# Patient Record
Sex: FEMALE | Race: ASIAN | NOT HISPANIC OR LATINO | Employment: FULL TIME | URBAN - METROPOLITAN AREA
[De-identification: names, ages, dates, MRNs, and addresses within clinical notes are randomized per-mention and may not be internally consistent; named-entity substitution may affect disease eponyms.]

---

## 2018-03-07 LAB
CHOLESTEROL (EXTERNAL): 160 MG/DL (ref 100–199)
HDLC SERPL-MCNC: 79 MG/DL
LDL CHOLESTEROL (EXTERNAL): 60 MG/DL
NON HDL CHOLESTEROL (EXTERNAL): 81 MG/DL
TRIGLYCERIDES (EXTERNAL): 104 MG/DL
TSH SERPL-ACNC: 1.58 UIU/ML (ref 0.35–4.94)

## 2018-06-19 LAB — HIV 1&2 EXT: NORMAL

## 2020-06-24 ENCOUNTER — TRANSFERRED RECORDS (OUTPATIENT)
Dept: MULTI SPECIALTY CLINIC | Facility: CLINIC | Age: 40
End: 2020-06-24

## 2020-06-24 LAB
HPV ABSTRACT: NORMAL
PAP-ABSTRACT: NORMAL

## 2022-04-18 ENCOUNTER — OFFICE VISIT (OUTPATIENT)
Dept: FAMILY MEDICINE | Facility: CLINIC | Age: 42
End: 2022-04-18
Payer: COMMERCIAL

## 2022-04-18 VITALS
DIASTOLIC BLOOD PRESSURE: 80 MMHG | WEIGHT: 140 LBS | HEIGHT: 61 IN | OXYGEN SATURATION: 99 % | RESPIRATION RATE: 25 BRPM | TEMPERATURE: 98.6 F | SYSTOLIC BLOOD PRESSURE: 126 MMHG | HEART RATE: 65 BPM | BODY MASS INDEX: 26.43 KG/M2

## 2022-04-18 DIAGNOSIS — Z11.59 NEED FOR HEPATITIS C SCREENING TEST: ICD-10-CM

## 2022-04-18 DIAGNOSIS — J34.2 DEVIATED NASAL SEPTUM: ICD-10-CM

## 2022-04-18 DIAGNOSIS — R05.9 COUGH: ICD-10-CM

## 2022-04-18 DIAGNOSIS — M25.659 STIFFNESS OF HIP JOINT, UNSPECIFIED LATERALITY: ICD-10-CM

## 2022-04-18 DIAGNOSIS — Z13.1 SCREENING FOR DIABETES MELLITUS: ICD-10-CM

## 2022-04-18 DIAGNOSIS — H65.03 NON-RECURRENT ACUTE SEROUS OTITIS MEDIA OF BOTH EARS: ICD-10-CM

## 2022-04-18 DIAGNOSIS — Z00.00 ROUTINE GENERAL MEDICAL EXAMINATION AT A HEALTH CARE FACILITY: Primary | ICD-10-CM

## 2022-04-18 DIAGNOSIS — R07.89 CHEST WALL PAIN: ICD-10-CM

## 2022-04-18 DIAGNOSIS — Z13.21 ENCOUNTER FOR VITAMIN DEFICIENCY SCREENING: ICD-10-CM

## 2022-04-18 DIAGNOSIS — R09.81 NASAL CONGESTION: ICD-10-CM

## 2022-04-18 DIAGNOSIS — Z13.0 SCREENING, ANEMIA, DEFICIENCY, IRON: ICD-10-CM

## 2022-04-18 DIAGNOSIS — Z13.220 LIPID SCREENING: ICD-10-CM

## 2022-04-18 LAB
ALBUMIN SERPL-MCNC: 4.1 G/DL (ref 3.4–5)
ALP SERPL-CCNC: 51 U/L (ref 40–150)
ALT SERPL W P-5'-P-CCNC: 32 U/L (ref 0–50)
ANION GAP SERPL CALCULATED.3IONS-SCNC: 4 MMOL/L (ref 3–14)
AST SERPL W P-5'-P-CCNC: 20 U/L (ref 0–45)
BILIRUB SERPL-MCNC: 0.4 MG/DL (ref 0.2–1.3)
BUN SERPL-MCNC: 13 MG/DL (ref 7–30)
CALCIUM SERPL-MCNC: 8.9 MG/DL (ref 8.5–10.1)
CHLORIDE BLD-SCNC: 108 MMOL/L (ref 94–109)
CHOLEST SERPL-MCNC: 162 MG/DL
CO2 SERPL-SCNC: 27 MMOL/L (ref 20–32)
CREAT SERPL-MCNC: 0.63 MG/DL (ref 0.52–1.04)
DEPRECATED CALCIDIOL+CALCIFEROL SERPL-MC: 48 UG/L (ref 20–75)
ERYTHROCYTE [DISTWIDTH] IN BLOOD BY AUTOMATED COUNT: 11.9 % (ref 10–15)
FASTING STATUS PATIENT QL REPORTED: YES
GFR SERPL CREATININE-BSD FRML MDRD: >90 ML/MIN/1.73M2
GLUCOSE BLD-MCNC: 101 MG/DL (ref 70–99)
HCT VFR BLD AUTO: 40.7 % (ref 35–47)
HCV AB SERPL QL IA: NONREACTIVE
HDLC SERPL-MCNC: 62 MG/DL
HGB BLD-MCNC: 13.4 G/DL (ref 11.7–15.7)
LDLC SERPL CALC-MCNC: 71 MG/DL
MCH RBC QN AUTO: 30.2 PG (ref 26.5–33)
MCHC RBC AUTO-ENTMCNC: 32.9 G/DL (ref 31.5–36.5)
MCV RBC AUTO: 92 FL (ref 78–100)
NONHDLC SERPL-MCNC: 100 MG/DL
PLATELET # BLD AUTO: 250 10E3/UL (ref 150–450)
POTASSIUM BLD-SCNC: 4.3 MMOL/L (ref 3.4–5.3)
PROT SERPL-MCNC: 7.5 G/DL (ref 6.8–8.8)
RBC # BLD AUTO: 4.43 10E6/UL (ref 3.8–5.2)
SODIUM SERPL-SCNC: 139 MMOL/L (ref 133–144)
TRIGL SERPL-MCNC: 147 MG/DL
WBC # BLD AUTO: 6.4 10E3/UL (ref 4–11)

## 2022-04-18 PROCEDURE — 82306 VITAMIN D 25 HYDROXY: CPT | Performed by: FAMILY MEDICINE

## 2022-04-18 PROCEDURE — 80061 LIPID PANEL: CPT | Performed by: FAMILY MEDICINE

## 2022-04-18 PROCEDURE — 36415 COLL VENOUS BLD VENIPUNCTURE: CPT | Performed by: FAMILY MEDICINE

## 2022-04-18 PROCEDURE — 99385 PREV VISIT NEW AGE 18-39: CPT | Performed by: FAMILY MEDICINE

## 2022-04-18 PROCEDURE — 85027 COMPLETE CBC AUTOMATED: CPT | Performed by: FAMILY MEDICINE

## 2022-04-18 PROCEDURE — 99214 OFFICE O/P EST MOD 30 MIN: CPT | Mod: 25 | Performed by: FAMILY MEDICINE

## 2022-04-18 PROCEDURE — 86803 HEPATITIS C AB TEST: CPT | Performed by: FAMILY MEDICINE

## 2022-04-18 PROCEDURE — 80053 COMPREHEN METABOLIC PANEL: CPT | Performed by: FAMILY MEDICINE

## 2022-04-18 RX ORDER — FLUTICASONE PROPIONATE 50 MCG
1 SPRAY, SUSPENSION (ML) NASAL DAILY
Qty: 16 G | Refills: 11 | Status: SHIPPED | OUTPATIENT
Start: 2022-04-18 | End: 2023-04-05

## 2022-04-18 RX ORDER — MULTIPLE VITAMINS W/ MINERALS TAB 9MG-400MCG
1 TAB ORAL DAILY
COMMUNITY

## 2022-04-18 RX ORDER — BENZONATATE 100 MG/1
100 CAPSULE ORAL 3 TIMES DAILY PRN
Qty: 30 CAPSULE | Refills: 1 | Status: SHIPPED | OUTPATIENT
Start: 2022-04-18

## 2022-04-18 RX ORDER — LORATADINE 10 MG/1
10 TABLET ORAL DAILY
COMMUNITY

## 2022-04-18 ASSESSMENT — ENCOUNTER SYMPTOMS
FREQUENCY: 0
HEMATURIA: 0
EYE PAIN: 0
WEAKNESS: 0
HEARTBURN: 0
COUGH: 1
CHILLS: 0
DIZZINESS: 0
SHORTNESS OF BREATH: 0
BREAST MASS: 0
NAUSEA: 0
JOINT SWELLING: 0
DIARRHEA: 0
FEVER: 0
ARTHRALGIAS: 0
DYSURIA: 0
NERVOUS/ANXIOUS: 0
CONSTIPATION: 0
PALPITATIONS: 0
ABDOMINAL PAIN: 0
HEADACHES: 0
MYALGIAS: 0
HEMATOCHEZIA: 0
SORE THROAT: 0
PARESTHESIAS: 0

## 2022-04-18 ASSESSMENT — PAIN SCALES - GENERAL: PAINLEVEL: NO PAIN (0)

## 2022-04-18 NOTE — PROGRESS NOTES
SUBJECTIVE:   CC: Christelle Diaz is an 41 year old woman who presents for preventive health visit.       Patient has been advised of split billing requirements and indicates understanding: Yes  Healthy Habits:     Getting at least 3 servings of Calcium per day:  Yes    Bi-annual eye exam:  Yes    Dental care twice a year:  NO    Sleep apnea or symptoms of sleep apnea:  Excessive snoring    Diet:  Regular (no restrictions)    Frequency of exercise:  2-3 days/week    Duration of exercise:  30-45 minutes    Taking medications regularly:  Yes    Medication side effects:  None    PHQ-2 Total Score: 0    Additional concerns today:  Yes  weight lifting, treadmill - tolerated well.      COVID in Jan - no symptoms - few weeks after - had cough.  Sneezing a lot, less now.  Throat itching.  Mild nasal congestion/stuffy.    Taking Claritin with improvement in congestion symptoms.    Son with COVID - fever for 3 days.        Popping ears/Nasal congestion/Deviated nasal septum  -complains of fullness sensation in the ears worse in the right than the left.  Present for a few weeks.  No fever noted.  No sinus pressure noted.  Does have anterior nasal drainage which has been clear and improved since she has taken Claritin.  The Claritin does not seem to have affected the ear symptoms.  She was told with a COVID test over a year ago that she had a deviated nasal septum which she did not know she had.  She is describing snoring recently per her family.    Chest wall pain -pain on the left side at the lower rib area.  Ports it is exclusively occurs when she bends over to reach something on the floor.  Gets a catch kind of a sensation lasting less than a minute in duration.  Does not get it with activity except the bending or crunching type motions.    Stiffness of hip joint, unspecified laterality  -new symptom for her that she is noted with recent moved back from [place].  After sitting for prolonged time she had discomfort at the  hips.  No groin pain and no pain with activity.      Today's PHQ-2 Score:   PHQ-2 ( 1999 Pfizer) 4/18/2022   Q1: Little interest or pleasure in doing things 0   Q2: Feeling down, depressed or hopeless 0   PHQ-2 Score 0   Q1: Little interest or pleasure in doing things Not at all   Q2: Feeling down, depressed or hopeless Not at all   PHQ-2 Score 0       Abuse: Current or Past (Physical, Sexual or Emotional) - No  Do you feel safe in your environment? Yes    Have you ever done Advance Care Planning? (For example, a Health Directive, POLST, or a discussion with a medical provider or your loved ones about your wishes): No, advance care planning information given to patient to review.  Patient plans to discuss their wishes with loved ones or provider.      Social History     Tobacco Use     Smoking status: Never Smoker     Smokeless tobacco: Never Used   Substance Use Topics     Alcohol use: Yes     Comment: minimal     If you drink alcohol do you typically have >3 drinks per day or >7 drinks per week? No    Alcohol Use 4/18/2022   Prescreen: >3 drinks/day or >7 drinks/week? No   Prescreen: >3 drinks/day or >7 drinks/week? -   No flowsheet data found.    Reviewed orders with patient.  Reviewed health maintenance and updated orders accordingly - Yes  BP Readings from Last 3 Encounters:   04/18/22 126/80    Wt Readings from Last 3 Encounters:   04/18/22 63.5 kg (140 lb)                    Breast Cancer Screening:    Breast CA Risk Assessment (FHS-7) 4/18/2022   Do you have a family history of breast, colon, or ovarian cancer? No / Unknown         Patient under 40 years of age: Routine Mammogram Screening not recommended.   Pertinent mammograms are reviewed under the imaging tab.    History of abnormal Pap smear: NO - age 30-65 PAP every 5 years with negative HPV co-testing recommended     Reviewed and updated as needed this visit by clinical staff   Tobacco  Allergies  Meds   Med Hx  Surg Hx  Fam Hx  Soc Hx       "    Reviewed and updated as needed this visit by Provider   Tobacco  Allergies  Meds   Med Hx  Surg Hx  Fam Hx  Soc Hx         History reviewed. No pertinent past medical history.   Past Surgical History:   Procedure Laterality Date     ENT SURGERY  8/6/1986    Tonsillectomy     OB History   No obstetric history on file.       Review of Systems   Constitutional: Negative for chills and fever.   HENT: Negative for congestion, ear pain, hearing loss and sore throat.    Eyes: Negative for pain and visual disturbance.   Respiratory: Positive for cough. Negative for shortness of breath.    Cardiovascular: Negative for chest pain, palpitations and peripheral edema.   Gastrointestinal: Negative for abdominal pain, constipation, diarrhea, heartburn, hematochezia and nausea.   Breasts:  Negative for tenderness, breast mass and discharge.   Genitourinary: Negative for dysuria, frequency, genital sores, hematuria, pelvic pain, urgency, vaginal bleeding and vaginal discharge.   Musculoskeletal: Negative for arthralgias, joint swelling and myalgias.   Skin: Negative for rash.   Neurological: Negative for dizziness, weakness, headaches and paresthesias.   Psychiatric/Behavioral: Negative for mood changes. The patient is not nervous/anxious.           OBJECTIVE:   /80   Pulse 65   Temp 98.6  F (37  C) (Tympanic)   Resp 25   Ht 1.537 m (5' 0.5\")   Wt 63.5 kg (140 lb)   LMP 04/16/2022 (Exact Date)   SpO2 99%   BMI 26.89 kg/m    Physical Exam  GENERAL: alert, no distress and over weight  EYES: Eyes grossly normal to inspection, PERRL and conjunctivae and sclerae normal  HENT: normal cephalic/atraumatic, right ear: clear effusion with air bubbles behind the tympanic membrane., left ear: clear effusion, nose and mouth without ulcers or lesions, nasal mucosa edematous , oropharynx clear, oral mucous membranes moist and nasal septum deviated to the left  NECK: no adenopathy, no asymmetry, masses, or scars and thyroid " normal to palpation  RESP: lungs clear to auscultation - no rales, rhonchi or wheezes  BREAST: normal without masses, tenderness or nipple discharge and no palpable axillary masses or adenopathy  CV: regular rate and rhythm, normal S1 S2, no S3 or S4, no murmur, click or rub, no peripheral edema and peripheral pulses strong  ABDOMEN: soft, nontender, no hepatosplenomegaly, no masses and bowel sounds normal  MS: tenderness to palpation along the left lower rib margin in the costochondral area.  No crepitus to palpation.  Full range of motion in the hips bilaterally.  No groin pain on movement.  Nontender on exam currently.  SKIN: no suspicious lesions or rashes  NEURO: Normal strength and tone, mentation intact and speech normal  PSYCH: mentation appears normal, affect normal/bright  LYMPH: no cervical, supraclavicular, axillary, or inguinal adenopathy    Diagnostic Test Results:  Labs reviewed in Epic  Results for orders placed or performed in visit on 04/18/22   CBC with platelets     Status: Normal   Result Value Ref Range    WBC Count 6.4 4.0 - 11.0 10e3/uL    RBC Count 4.43 3.80 - 5.20 10e6/uL    Hemoglobin 13.4 11.7 - 15.7 g/dL    Hematocrit 40.7 35.0 - 47.0 %    MCV 92 78 - 100 fL    MCH 30.2 26.5 - 33.0 pg    MCHC 32.9 31.5 - 36.5 g/dL    RDW 11.9 10.0 - 15.0 %    Platelet Count 250 150 - 450 10e3/uL       ASSESSMENT/PLAN:   (Z00.00) Routine general medical examination at a health care facility  (primary encounter diagnosis)  Comment: Fasting, new patient in the office  Plan: Comprehensive metabolic panel (BMP + Alb, Alk         Phos, ALT, AST, Total. Bili, TP)        Screening and preventive care discussed updated records with previous Pap smear screening obtained in June 2020    (H65.03) Non-recurrent acute serous otitis media of both ears  (R09.81) Nasal congestion  Comment: Treatment with nasal steroid in attempt to decrease inflammation around the eustachian tube and allow for drainage and control of  symptoms.  Plan: fluticasone (FLONASE) 50 MCG/ACT nasal spray        Follow-up if persistent symptoms are noted.  Claritin use consistently or episodically may help minimize symptoms as well and that was discussed.    (J34.2) Deviated nasal septum  Comment: Probable recurrence following injury to the nose from her son's head  Plan: fluticasone (FLONASE) 50 MCG/ACT nasal spray        She does not notice any pain or particular obstructive breathing but snoring and some nasal congestion has been noted as described.  Topical nasal steroid is planned.  If any persistent symptoms remain after use of this for 6 to 8 weeks we can consider evaluation with ENT.    (R05.9) Cough  Comment: Post COVID residual cough.  Plan: benzonatate (TESSALON) 100 MG capsule        Exam is normal with regard to both lungs.  Cough not brought on with activity per se.  Trial of Tessalon Perles for symptomatic use and follow-up if persistent symptoms noted.    (R07.89) Chest wall pain  Comment: Tenderness along the left lower rib margin.  This could be persisting due to this episodic cough she is noting  Plan: Treat cough as noted above.  Monitor symptoms.  Consider NSAIDs if persistent.    (M25.659) Stiffness of hip joint, unspecified laterality  Comment: Related to relative inactivity on its initial occurrence.  Plan: Stretching exercises and expanding her physical activity discussed.  She will follow-up if persistent symptoms are noted.    (Z13.21) Encounter for vitamin deficiency screening  Comment: A low vitamin D could be contributing to some of the muscular symptoms she is noticing above.  Plan: Vitamin D Deficiency        Screening vitamin D level    (Z11.59) Need for hepatitis C screening test  Comment: Low risk  Plan: Hepatitis C Screen Reflex to HCV RNA Quant and         Genotype        Screening    (Z13.0) Screening, anemia, deficiency, iron  Comment: Low hemoglobin following vaginal delivery and no recent testing since then.  Plan:  "CBC with platelets            (Z13.1) Screening for diabetes mellitus  Comment: Fasting for blood sugar testing  Plan: Comprehensive metabolic panel (BMP + Alb, Alk         Phos, ALT, AST, Total. Bili, TP)            (Z13.220) Lipid screening  Comment: Fasting.  No family history known  Plan: Lipid panel reflex to direct LDL Fasting              Patient has been advised of split billing requirements and indicates understanding: Yes    COUNSELING:  Reviewed preventive health counseling, as reflected in patient instructions       Regular exercise       Healthy diet/nutrition       Vision screening       Contraception       Family planning       Osteoporosis prevention/bone health       Consider Hep C screening for all patients one time for ages 18-79 years    Estimated body mass index is 26.89 kg/m  as calculated from the following:    Height as of this encounter: 1.537 m (5' 0.5\").    Weight as of this encounter: 63.5 kg (140 lb).    Weight management plan: Discussed healthy diet and exercise guidelines    She reports that she has never smoked. She has never used smokeless tobacco.      Counseling Resources:  ATP IV Guidelines  Pooled Cohorts Equation Calculator  Breast Cancer Risk Calculator  BRCA-Related Cancer Risk Assessment: FHS-7 Tool  FRAX Risk Assessment  ICSI Preventive Guidelines  Dietary Guidelines for Americans, 2010  [employer]'s MyPlate  ASA Prophylaxis  Lung CA Screening    Lizeth Guadalupe MD  [facility]          Patient Instructions   Labs today.  This will include screening testing for cholesterol, diabetes.    Mammogram recommended at age 40.   You can call [phone] [facility] [place]     Stretching exercises recommended for hips and strengthening for core.    For the cough - use tessalon perles up to three times a day as needed cough.          This chart was documented by provider using a voice activated software called Dragon in addition " to manual typing. There may be vocabulary errors or other grammatical errors due to this.

## 2022-04-18 NOTE — Clinical Note
Please abstract the following data from this visit with this patient into the appropriate field in Epic:  Tests that can be patient reported without a hard copy:    Other Tests found in the patient's chart through Chart Review/Care Everywhere:  HPV done by this group [facility] on this date: 6/24/2020 - negative result.  Note to Abstraction: If this section is blank, no results were found via Chart Review/Care Everywhere.

## 2022-04-18 NOTE — RESULT ENCOUNTER NOTE
It was a pleasure to meet you today.  The initial blood results have returned.  Your blood cell counts are normal.  There is no anemia present.  Please call or MyChart message me if you have any questions.      EMELY

## 2022-04-19 NOTE — RESULT ENCOUNTER NOTE
"Hepatitis C testing is negative/normal  Your cholesterol levels are in the normal range.  Your kidney function and liver testing are normal.  Your blood sugar is borderline elevated.  This is in the \"prediabetic\" range.  Exercise and limiting carbohydrate and sugars in diet can be helpful at preventing progression to diabetes.  Your vitamin D level is normal.  Continue your current vitamin D intake in diet and supplements.  Please call or MyChart message me if you have any questions.    PSK"

## 2022-10-03 ENCOUNTER — HEALTH MAINTENANCE LETTER (OUTPATIENT)
Age: 42
End: 2022-10-03

## 2022-12-29 ENCOUNTER — LAB REQUISITION (OUTPATIENT)
Dept: LAB | Facility: CLINIC | Age: 42
End: 2022-12-29

## 2022-12-29 DIAGNOSIS — Z36.85 ENCOUNTER FOR ANTENATAL SCREENING FOR STREPTOCOCCUS B: ICD-10-CM

## 2022-12-29 PROCEDURE — 87653 STREP B DNA AMP PROBE: CPT | Performed by: ADVANCED PRACTICE MIDWIFE

## 2022-12-30 LAB — GP B STREP DNA SPEC QL NAA+PROBE: POSITIVE

## 2023-03-17 ENCOUNTER — E-VISIT (OUTPATIENT)
Dept: FAMILY MEDICINE | Facility: CLINIC | Age: 43
End: 2023-03-17
Payer: COMMERCIAL

## 2023-03-17 DIAGNOSIS — R10.84 ABDOMINAL PAIN, GENERALIZED: ICD-10-CM

## 2023-03-17 DIAGNOSIS — K29.00 ACUTE GASTRITIS WITHOUT HEMORRHAGE, UNSPECIFIED GASTRITIS TYPE: Primary | ICD-10-CM

## 2023-03-17 PROCEDURE — 99422 OL DIG E/M SVC 11-20 MIN: CPT | Performed by: FAMILY MEDICINE

## 2023-04-05 ENCOUNTER — ANCILLARY PROCEDURE (OUTPATIENT)
Dept: ULTRASOUND IMAGING | Facility: CLINIC | Age: 43
End: 2023-04-05
Attending: FAMILY MEDICINE
Payer: COMMERCIAL

## 2023-04-05 ENCOUNTER — TELEPHONE (OUTPATIENT)
Dept: FAMILY MEDICINE | Facility: CLINIC | Age: 43
End: 2023-04-05

## 2023-04-05 DIAGNOSIS — R10.84 ABDOMINAL PAIN, GENERALIZED: ICD-10-CM

## 2023-04-05 DIAGNOSIS — R93.5 ABNORMAL ULTRASOUND OF ABDOMEN: Primary | ICD-10-CM

## 2023-04-05 LAB — RADIOLOGIST FLAGS: NORMAL

## 2023-04-05 PROCEDURE — 76700 US EXAM ABDOM COMPLETE: CPT | Performed by: STUDENT IN AN ORGANIZED HEALTH CARE EDUCATION/TRAINING PROGRAM

## 2023-04-05 NOTE — TELEPHONE ENCOUNTER
Beatriz calling regarding incidental finding on abdominal ultrasound on 4/5/23.     Routed to provider for review.     Graciela Sanchez RN

## 2023-04-06 ENCOUNTER — TELEPHONE (OUTPATIENT)
Dept: FAMILY MEDICINE | Facility: CLINIC | Age: 43
End: 2023-04-06

## 2023-04-06 DIAGNOSIS — R10.13 ABDOMINAL PAIN, EPIGASTRIC: Primary | ICD-10-CM

## 2023-04-06 NOTE — TELEPHONE ENCOUNTER
Please assist with scheduling in person follow up visit - any available slot next week.    Trial of Omeprazole 20 mg to take 30-60 min prior to a meal once daily - I am sending script.  Use this prior to appointment next week.      PSK

## 2023-04-06 NOTE — RESULT ENCOUNTER NOTE
"Please call patient re:  results of ultrasound.  See below.  1.  Find out if patient has had any benefit from famotidine use that was recommended with Evisit.  2.  If no benefit, in person visit is recommended.  3.  CT scan recommended by radiology to get a better look at the spleen area -not able to see fully on the U/S.  If willing, I will place order and she can call to set up CT scan.   Call [phone] to schedule this at the [facility] in [place]    PSK        Your liver is normal.    Gallbladder may have some \"sludge\" or a polyp present but no signs of inflammation is noted. There are no dilated ducts or blockages from the liver to the intestines.    Pancreas is normal.  Your kidneys are normal.  They are not able to see the spleen area entirely and have recommended additional evaluation with CT scan of the area to fully evaluate this.  We will be in contact regarding this.  Please call or MyChart message me if you have any questions.      PSK"

## 2023-04-06 NOTE — TELEPHONE ENCOUNTER
Patient calling back for results. RN relayed provider's note below.    1. Patient says famotidine not really helping.   2. Patient wondering when PCP would like follow up? She has the CT already scheduled tomorrow 3:20 pm and wondered if you would like to see results first.  3. Has already scheduled a CT for tomorrow.    Routing to provider to review and advise.    CASIMIRO Winn, RN, PHN  [facility]

## 2023-04-06 NOTE — TELEPHONE ENCOUNTER
Called pt and she said she has been taking omeprazole without relief. She said she took this during and agter her pregnancy without relief. She is also sched for CT tomorrow.  Kelsie Rhodes, CMA

## 2023-05-18 NOTE — TELEPHONE ENCOUNTER
"RN called patient and LVM to call clinic at [phone].     If patient calls back please relay provider message below.    Danielle De La O RN  [facility]        ----- Message from Lizeth Guadalupe MD sent at 4/5/2023  8:25 PM CDT -----  Please call patient re:  results of ultrasound.  See below.  1.  Find out if patient has had any benefit from famotidine use that was recommended with Evisit.  2.  If no benefit, in person visit is recommended.  3.  CT scan recommended by radiology to get a better look at the spleen area -not able to see fully on the U/S.  If willing, I will place order and she can call to set up CT scan.   Call [phone] to schedule this at the [facility] in [place]     PSK      Your liver is normal.    Gallbladder may have some \"sludge\" or a polyp present but no signs of inflammation is noted. There are no dilated ducts or blockages from the liver to the intestines.    Pancreas is normal.  Your kidneys are normal.  They are not able to see the spleen area entirely and have recommended additional evaluation with CT scan of the area to fully evaluate this.  We will be in contact regarding this.  Please call or MyChart message me if you have any questions.      PSK  " Addended by: MAZIN PITTS on: 5/18/2023 06:30 PM     Modules accepted: Orders

## 2023-05-21 ENCOUNTER — HEALTH MAINTENANCE LETTER (OUTPATIENT)
Age: 43
End: 2023-05-21

## 2023-11-27 NOTE — PATIENT INSTRUCTIONS
Labs today.  This will include screening testing for cholesterol, diabetes.    Mammogram recommended at age 40.   You can call [phone] to schedule this at the [facility] building in [place]     Stretching exercises recommended for hips and strengthening for core.    For the cough - use tessalon perles up to three times a day as needed cough.      Preventive Health Recommendations  Female Ages 26 - 39  Yearly exam:   See your health care provider every year in order to  Review health changes.   Discuss preventive care.    Review your medicines if you your doctor has prescribed any.    Until age 30: Get a Pap test every three years (more often if you have had an abnormal result).    After age 30: Talk to your doctor about whether you should have a Pap test every 3 years or have a Pap test with HPV screening every 5 years.   You do not need a Pap test if your uterus was removed (hysterectomy) and you have not had cancer.  You should be tested each year for STDs (sexually transmitted diseases), if you're at risk.   Talk to your provider about how often to have your cholesterol checked.  If you are at risk for diabetes, you should have a diabetes test (fasting glucose).  Shots: Get a flu shot each year. Get a tetanus shot every 10 years.   Nutrition:   Eat at least 5 servings of fruits and vegetables each day.  Eat whole-grain bread, whole-wheat pasta and brown rice instead of white grains and rice.  Get adequate Calcium and Vitamin D.     Lifestyle  Exercise at least 150 minutes a week (30 minutes a day, 5 days of the week). This will help you control your weight and prevent disease.  Limit alcohol to one drink per day.  No smoking.   Wear sunscreen to prevent skin cancer.  See your dentist every six months for an exam and cleaning.    
Family

## 2024-03-10 ENCOUNTER — HEALTH MAINTENANCE LETTER (OUTPATIENT)
Age: 44
End: 2024-03-10

## 2024-07-28 ENCOUNTER — HEALTH MAINTENANCE LETTER (OUTPATIENT)
Age: 44
End: 2024-07-28

## 2025-08-10 ENCOUNTER — HEALTH MAINTENANCE LETTER (OUTPATIENT)
Age: 45
End: 2025-08-10